# Patient Record
Sex: FEMALE | Race: WHITE | URBAN - METROPOLITAN AREA
[De-identification: names, ages, dates, MRNs, and addresses within clinical notes are randomized per-mention and may not be internally consistent; named-entity substitution may affect disease eponyms.]

---

## 2020-04-06 ENCOUNTER — VIRTUAL VISIT (OUTPATIENT)
Dept: FAMILY MEDICINE | Facility: OTHER | Age: 50
End: 2020-04-06

## 2020-04-06 NOTE — PROGRESS NOTES
"Date: 2020 08:09:55  Clinician: Chandni Roman  Clinician NPI: [id number]  Patient: Amanda Babin  Patient : 1970  Patient Address: [address] [place] [address]  Patient Phone: [phone]  Visit Protocol: URI  Patient Summary:  Amanda is a 49 year old ( : 1970 ) female who initiated a Visit for COVID-19 (Coronavirus) evaluation and screening. When asked the question \"Please sign me up to receive news, health information and promotions from [employer].\", Amanda responded \"No\".    Amanda states her symptoms started gradually 3-6 days ago. After her symptoms started, they improved and then got worse again.   Her symptoms consist of rhinitis, facial pain or pressure, myalgia, a sore throat, nasal congestion, malaise, chills, and a headache. She is experiencing mild difficulty breathing with activities but can speak normally in full sentences. Amanda also feels feverish.   Symptom details     Nasal secretions: The color of her mucus is clear.    Sore throat: Amanda reports having mild throat pain (1-3 on a 10 point pain scale), does not have exudate on her tonsils, and can swallow liquids. She is not sure if the lymph nodes in her neck are enlarged. A rash has not appeared on the skin since the sore throat started.     Temperature: Her current temperature is 99.6 degrees Fahrenheit.     Facial pain or pressure: The facial pain or pressure feels worse when bending over or leaning forward.     Headache: She states the headache is moderate (4-6 on a 10 point pain scale).      Amanda denies having cough, ear pain, diarrhea, vomiting, nausea, teeth pain, and wheezing. She also denies taking antibiotic medication for the symptoms, having recent facial or sinus surgery in the past 60 days, and having a sinus infection within the past year.   Precipitating events  Within the past week, Aamnda has not been exposed to someone with strep throat. She has not recently been exposed to someone with influenza. Amanda has not " been in close contact with any high risk individuals.   Pertinent COVID-19 (Coronavirus) information  Amanda has not traveled internationally or to the areas where COVID-19 (Coronavirus) is widespread, including cruise ship travel in the last 14 days before the start of her symptoms.   Amanda has not had a close contact with a laboratory-confirmed COVID-19 patient within 14 days of symptom onset. She also has not had a close contact with a suspected COVID-19 patient within 14 days of symptom onset.   Amanda does not work or volunteer as healthcare worker or a  and does not work or volunteer in a healthcare facility. She does not live with a healthcare worker.   Pertinent medical history  Amanda does not get yeast infections when she takes antibiotics.   Amanda needs a return to work/school note.   Weight: 120 lbs   Amanda does not smoke or use smokeless tobacco.   She denies pregnancy and denies breastfeeding. She has menstruated in the past month.   Additional information as reported by the patient (free text): A couple of weeks ago I was getting night sweats.  My muscles hurt/ache for about a week now, like after a major workout (that I have not had).  I never run a fever, but have been since April 1,  degrees.  I was fever free yesterday, with the other symptoms, but have a fever again today.  Very tired with fever.  My  is in and out of grocery stores all day long.  I am working from home since March 30.  I have seasonal allergies, but how I am feeling is different.   Weight: 120 lbs    MEDICATIONS: Lexapro oral, flecainide oral, ALLERGIES: Sulfa (Sulfonamide Antibiotics), Bactrim  Clinician Response:  Dear Amanda,  Based on the information provided, you have acute bacterial sinusitis, also known as a sinus infection. Sinus infections are caused by bacteria or a virus and symptoms are almost always identical. The difference between the 2 types of infections is timing.  Sinus infections start as  viral infections and symptoms improve on their own in about 7 days. If symptoms have not improved after 7 days or have even worsened, a bacterial infection may have developed.  Self care  Steps you can take to be as comfortable as possible:     Rest.    Drink plenty of fluids.    Take a warm shower to loosen congestion    Use a cool-mist humidifier.    Use throat lozenges.    Suck on frozen items such as popsicles.    Drink hot tea with lemon and honey.    Gargle with warm salt water (1/4 teaspoon of salt per 8 ounce glass of water).     When to seek care  Please be seen in a clinic or urgent care if any of the following occur:     New symptoms develop, or symptoms become worse    Symptoms do not start to improve after 3 days of treatment     Call ahead before going to the clinic or urgent care.  Call 911 or go to the emergency room if you feel that your throat is closing off, you suddenly develop a rash, you are unable to swallow fluids, you are drooling, or you are having difficulty breathing.  Additional treatment plan     Based on the information you have provided, you do have symptoms that are consistent with Coronavirus (COVID-19).   The coronavirus causes mild to severe respiratory illness with the most common symptoms including fever, cough and difficulty breathing. Unfortunately, many viruses cause similar symptoms and it can be difficult to distinguish between viruses, especially in mild cases, so we are presuming that anyone with cough or fever has coronavirus at this time.  Coronavirus/COVID-19 has reached the point of community spread in [place], meaning that we are finding the virus in people with no known exposure risk for alistair the virus. Given the increasing commonness of coronavirus in the community we are no longer testing patients who are not critically ill.  If you are a health care worker, you should refer to your employee health office for instructions about testing and returning to  work.  For everyone else who has cough or fever, you should assume you are infected with coronavirus. Since you will not be tested but have symptoms that may be consistent with coronavirus, the CDC recommends you stay in self-isolation until these three things have happened:    You have had no fever for at least 72 hours (that is three full days of no fever without the use of medicine that reduces fevers)    AND   Other symptoms have improved (for example, when your cough or shortness of breath have improved)   AND   At least 7 days have passed since your symptoms first appeared.   How to Isolate:    Isolate yourself at home.   Do Not allow any visitors  Do Not go to work or school  Do Not go to Judaism,  centers, shopping, or other public places.  Do Not shake hands.  Avoid close contact with others (hugging, kissing).   Protect Others:    Cover Your Mouth and Nose with a mask, disposable tissue or wash cloth to avoid spreading germs to others.  Wash your hands and face frequently with soap and water.   Managing Symptoms:    At this time, we primarily recommend Tylenol (Acetaminophen) for fever or pain. If you have liver or kidney problems, contact your primary care provider for instructions on use of tylenol. Adults can take 650 mg (two 325 mg pills) by mouth every 4-6 hours as needed OR 1,000 mg (two 500 mg pills) every 8 hours as needed. MAXIMUM DAILY DOSE: 3,000mg. For children, refer to dosing on bottle based on age or weight.   If you develop significant shortness of breath that prevents you from doing normal activities, please call 911 or proceed to the nearest emergency room and alert them immediately that you have been in self-isolation for possible coronavirus.   If you have a higher risk medical condition such as cancer, heart failure, end stage renal disease on dialysis or have a transplant, please reach out to your specialist's clinic to advise them of your [facility] visit should you not improve  within the next two days.  For more information about COVID19 and options for caring for yourself at home, please visit the CDC website at https://www.cdc.gov/coronavirus/2019-ncov/about/steps-when-sick.htmlFor more options for care at [facility], please visit our website at [email or web address]     Diagnosis: Acute upper respiratory infection, unspecified  Diagnosis ICD: J06.9  Additional Clinician Notes: Vinod Patel, I am so sorry you are not feeling well. Your symptoms are consistent with a viral upper respiratory infection. There is some concern for COVID-19 with cough and possible exposure. [facility] is reserving limited testing supplies for patients who are critically ill. In accordance to CDC guidelines, individuals who are experiencing symptoms - including fever or cough - should self-quarantine for a minimum of 7 days after illness onset, or 72 hours after resolution of fever (without taking fever-reducing medications), and improvement of respiratory symptoms, whichever is longer. I recommend rest, pushing fluids, tea with honey, bone broth soup, vitamin C and zinc such as elderberry, warm showers, humidifier, mucinex or robitussin, cepacol or chloraseptic lozenges, rotate tylenol and ibuprofen every 4-6 hours. Start Augmentin for bacterial sinusitis if your facial pain, pressure, headaches, fever lasts longer than 10 days.